# Patient Record
Sex: FEMALE | Race: OTHER
[De-identification: names, ages, dates, MRNs, and addresses within clinical notes are randomized per-mention and may not be internally consistent; named-entity substitution may affect disease eponyms.]

---

## 2019-03-15 ENCOUNTER — HOSPITAL ENCOUNTER (EMERGENCY)
Dept: HOSPITAL 72 - EMR | Age: 29
Discharge: HOME | End: 2019-03-15
Payer: SELF-PAY

## 2019-03-15 VITALS — HEIGHT: 71 IN | BODY MASS INDEX: 39.2 KG/M2 | WEIGHT: 280 LBS

## 2019-03-15 VITALS — DIASTOLIC BLOOD PRESSURE: 82 MMHG | SYSTOLIC BLOOD PRESSURE: 129 MMHG

## 2019-03-15 DIAGNOSIS — K76.0: ICD-10-CM

## 2019-03-15 DIAGNOSIS — R10.9: ICD-10-CM

## 2019-03-15 DIAGNOSIS — R19.7: Primary | ICD-10-CM

## 2019-03-15 LAB
ADD MANUAL DIFF: NO
ALBUMIN SERPL-MCNC: 3.4 G/DL (ref 3.4–5)
ALBUMIN/GLOB SERPL: 0.8 {RATIO} (ref 1–2.7)
ALP SERPL-CCNC: 101 U/L (ref 46–116)
ALT SERPL-CCNC: 65 U/L (ref 12–78)
ANION GAP SERPL CALC-SCNC: 6 MMOL/L (ref 5–15)
APPEARANCE UR: (no result)
APTT PPP: (no result) S
AST SERPL-CCNC: 27 U/L (ref 15–37)
BASOPHILS NFR BLD AUTO: 1.3 % (ref 0–2)
BILIRUB SERPL-MCNC: 0.3 MG/DL (ref 0.2–1)
BUN SERPL-MCNC: 10 MG/DL (ref 7–18)
CALCIUM SERPL-MCNC: 9.4 MG/DL (ref 8.5–10.1)
CHLORIDE SERPL-SCNC: 102 MMOL/L (ref 98–107)
CO2 SERPL-SCNC: 29 MMOL/L (ref 21–32)
CREAT SERPL-MCNC: 0.9 MG/DL (ref 0.55–1.3)
EOSINOPHIL NFR BLD AUTO: 1.5 % (ref 0–3)
ERYTHROCYTE [DISTWIDTH] IN BLOOD BY AUTOMATED COUNT: 12 % (ref 11.6–14.8)
GLOBULIN SER-MCNC: 4.2 G/DL
GLUCOSE UR STRIP-MCNC: NEGATIVE MG/DL
HCT VFR BLD CALC: 42.2 % (ref 37–47)
HGB BLD-MCNC: 13.5 G/DL (ref 12–16)
KETONES UR QL STRIP: (no result)
LEUKOCYTE ESTERASE UR QL STRIP: (no result)
LYMPHOCYTES NFR BLD AUTO: 21 % (ref 20–45)
MCV RBC AUTO: 87 FL (ref 80–99)
MONOCYTES NFR BLD AUTO: 7.8 % (ref 1–10)
NEUTROPHILS NFR BLD AUTO: 68.5 % (ref 45–75)
NITRITE UR QL STRIP: NEGATIVE
PH UR STRIP: 5 [PH] (ref 4.5–8)
PLATELET # BLD: 354 K/UL (ref 150–450)
POTASSIUM SERPL-SCNC: 3.8 MMOL/L (ref 3.5–5.1)
PROT UR QL STRIP: (no result)
RBC # BLD AUTO: 4.85 M/UL (ref 4.2–5.4)
SODIUM SERPL-SCNC: 136 MMOL/L (ref 136–145)
SP GR UR STRIP: 1.02 (ref 1–1.03)
UROBILINOGEN UR-MCNC: NORMAL MG/DL (ref 0–1)
WBC # BLD AUTO: 10.1 K/UL (ref 4.8–10.8)

## 2019-03-15 PROCEDURE — 76700 US EXAM ABDOM COMPLETE: CPT

## 2019-03-15 PROCEDURE — 36415 COLL VENOUS BLD VENIPUNCTURE: CPT

## 2019-03-15 PROCEDURE — 80053 COMPREHEN METABOLIC PANEL: CPT

## 2019-03-15 PROCEDURE — 85025 COMPLETE CBC W/AUTO DIFF WBC: CPT

## 2019-03-15 PROCEDURE — 87086 URINE CULTURE/COLONY COUNT: CPT

## 2019-03-15 PROCEDURE — 96374 THER/PROPH/DIAG INJ IV PUSH: CPT

## 2019-03-15 PROCEDURE — 96361 HYDRATE IV INFUSION ADD-ON: CPT

## 2019-03-15 PROCEDURE — 83690 ASSAY OF LIPASE: CPT

## 2019-03-15 PROCEDURE — 96375 TX/PRO/DX INJ NEW DRUG ADDON: CPT

## 2019-03-15 PROCEDURE — 81003 URINALYSIS AUTO W/O SCOPE: CPT

## 2019-03-15 PROCEDURE — 99284 EMERGENCY DEPT VISIT MOD MDM: CPT

## 2019-03-15 NOTE — NUR
ED Nurse Note:



Pt from [facility] came in due to abd. pain with N/V and liquid diarrhea x 3 days. Pt 
alos c/o decreased in appetite. Pt is AAO x4, ambulatory with unlabored 
breathing.

## 2019-03-15 NOTE — EMERGENCY ROOM REPORT
History of Present Illness


General


Chief Complaint:  Abdominal Pain


Source:  Patient





Present Illness


HPI


28-year-old female patient presents the ER complaining of abdominal pain, nausea

, diarrhea for the past 3 days.  Reports subjective fever at home, afebrile 

currently in the ER.  Denies vomiting.  Denies blood in diarrhea.  Reports 

nausea.  Denies recent travel outside the country.  Reports abdominal pain is 

mostly in the right upper quadrant pain during this time.  Denies other 

aggravating or relieving factors.  Denies dysuria, hematuria, vaginal 

discharge.  Denies chest pain or shortness of breath.


Allergies:  


Coded Allergies:  


     No Known Allergies (Unverified , 10/2/17)





Patient History


Past Medical History:  see triage record


Last Menstrual Period:  on period


Reviewed Nursing Documentation:  PMH: Agreed; PSxH: Agreed





Nursing Documentation-PMH


Past Medical History:  No History, Except For





Review of Systems


All Other Systems:  negative except mentioned in HPI





Physical Exam





Vital Signs








  Date Time  Temp Pulse Resp B/P (MAP) Pulse Ox O2 Delivery O2 Flow Rate FiO2


 


3/15/19 16:49 98.8 108 16 134/87 96 Room Air  








Sp02 EP Interpretation:  reviewed, normal


General Appearance:  well appearing, no apparent distress, alert, GCS 15, non-

toxic


Head:  normocephalic, atraumatic


Eyes:  bilateral eye normal inspection, bilateral eye PERRL


ENT:  hearing grossly normal, normal pharynx, no angioedema, normal voice, 

uvula midline, moist mucus membranes


Neck:  full range of motion, no meningismus, no bony tend


Respiratory:  lungs clear, normal breath sounds, no rhonchi, no respiratory 

distress, no accessory muscle use, no wheezing, speaking full sentences


Cardiovascular #1:  regular rate, rhythm, no edema


Gastrointestinal:  normal bowel sounds, soft, no mass, non-distended, no 

guarding, no rebound, tenderness - Right upper quadrant, other - Negative Morgan

, negative obturator, negative Rovsing, negative heel strike


Genitourinary:  no CVA tenderness


Musculoskeletal:  back normal, digits/nails normal, gait/station normal, normal 

range of motion, non-tender


Neurologic:  alert, oriented x3, responsive, motor strength/tone normal, 

sensory intact


Skin:  no rash





Medical Decision Making


PA Attestation


Dr. Hu  is my supervising Physician whom patient management has been 

discussed with.


Diagnostic Impression:  


 Primary Impression:  


 Diarrhea


 Additional Impressions:  


 Abdominal pain


 Hepatic steatosis


ER Course


Pt. presents to the ED c/o  abdominal pain and diarrhea.





Ddx considered but are not limited to viral syndrome, gastritis, enteritis, 

food poisoning, GERD, reflux, cholecystitis, cholelithiasis, pancreatitis, 

dehydration.


Physical exam benign, negative Rovsing, negative obturator, low suspicion for 

appendicitis.





Vital signs: are WNL, pt. is afebrile at discharge.





ED COURSE:


Provide with Zofran pain medication and GI cocktail.





No fever, no blood in stool, no recent travel or hospitalizations, does not 

require abx treatment at this time.


No signs of dehydration, moist mucus membranes, cap refill <2seconds, normal 

skin turgor.  Provide with IV fluids.





CBC and CMP unremarkable, no elevation WBCs in LFTs, lipase within normal 

limits.


UA shows moderate epithelial cells, likely contaminated sample, patient denies 

dysuria or hematuria, low suspicion for UTI, does not require antibiotics at 

this time.  Elevated RBCs likely due to patient currently on menstrual period.





Abdominal ultrasound shows hepatic steatosis and hepatomegaly, otherwise 

negative.


Discuss results with the patient.  


Provided patient with copy of results. 


Instructed patient to followup with PCP and discuss results of report with 

patient, discuss need for further treatment and referral.


Follow-up with GI specialist.





Patient instructed on clear liquid and BRAT diet. Patient instructed to remain 

hydrated, drink plenty of fluids.


Patient questions asked and answered.


Patient states understanding and agreement to treatment plan.


ER precautions given, return to ER for new or worsening of symptoms.





DISCHARGE: 





At this time pt. is stable for d/c to home.  Patient is resting comfortably, 

laughing, in no acute distress, nontoxic appearing.


Will provide printed patient care instructions, and any necessary 

prescriptions. 


Care plan and follow up instructions have been discussed with the patient prior 

to discharge.


Patient instructed to followup with PCP in 3-5 days.


Patient reports understanding and agreement to treatment plan.


Patient questions asked and answered.


ER precautions given; patient instructed to return to ER for new or worsening 

of symptoms including but not limited to fever, intractable vomiting, severe 

abdominal pain, blood in stool.





- Please note that this Emergency Department Report was dictated using [employer] technology software, occasionally this can lead to 

erroneous entry secondary to interpretation by the dictation equipment.





Labs








Test


  3/15/19


17:28 3/15/19


17:30


 


White Blood Count


  10.1 K/UL


(4.8-10.8) 


 


 


Red Blood Count


  4.85 M/UL


(4.20-5.40) 


 


 


Hemoglobin


  13.5 G/DL


(12.0-16.0) 


 


 


Hematocrit


  42.2 %


(37.0-47.0) 


 


 


Mean Corpuscular Volume 87 FL (80-99)  


 


Mean Corpuscular Hemoglobin


  27.9 PG


(27.0-31.0) 


 


 


Mean Corpuscular Hemoglobin


Concent 32.1 G/DL


(32.0-36.0) 


 


 


Red Cell Distribution Width


  12.0 %


(11.6-14.8) 


 


 


Platelet Count


  354 K/UL


(150-450) 


 


 


Mean Platelet Volume


  6.8 FL


(6.5-10.1) 


 


 


Neutrophils (%) (Auto)


  68.5 %


(45.0-75.0) 


 


 


Lymphocytes (%) (Auto)


  21.0 %


(20.0-45.0) 


 


 


Monocytes (%) (Auto)


  7.8 %


(1.0-10.0) 


 


 


Eosinophils (%) (Auto)


  1.5 %


(0.0-3.0) 


 


 


Basophils (%) (Auto)


  1.3 %


(0.0-2.0) 


 


 


Sodium Level


  136 MMOL/L


(136-145) 


 


 


Potassium Level


  3.8 MMOL/L


(3.5-5.1) 


 


 


Chloride Level


  102 MMOL/L


() 


 


 


Carbon Dioxide Level


  29 MMOL/L


(21-32) 


 


 


Anion Gap


  6 mmol/L


(5-15) 


 


 


Blood Urea Nitrogen


  10 mg/dL


(7-18) 


 


 


Creatinine


  0.9 MG/DL


(0.55-1.30) 


 


 


Estimat Glomerular Filtration


Rate > 60 mL/min


(>60) 


 


 


Glucose Level


  122 MG/DL


() 


 


 


Calcium Level


  9.4 MG/DL


(8.5-10.1) 


 


 


Total Bilirubin


  0.3 MG/DL


(0.2-1.0) 


 


 


Aspartate Amino Transf


(AST/SGOT) 27 U/L (15-37) 


  


 


 


Alanine Aminotransferase


(ALT/SGPT) 65 U/L (12-78) 


  


 


 


Alkaline Phosphatase


  101 U/L


() 


 


 


Total Protein


  7.6 G/DL


(6.4-8.2) 


 


 


Albumin


  3.4 G/DL


(3.4-5.0) 


 


 


Globulin 4.2 g/dL  


 


Albumin/Globulin Ratio 0.8 (1.0-2.7)  


 


Lipase


  85 U/L


() 


 


 


Urine Color  Red 


 


Urine Appearance


  


  Slightly


cloudy


 


Urine pH  5 (4.5-8.0) 


 


Urine Specific Gravity


  


  1.020


(1.005-1.035)


 


Urine Protein  4+ (NEGATIVE) 


 


Urine Glucose (UA)


  


  Negative


(NEGATIVE)


 


Urine Ketones  1+ (NEGATIVE) 


 


Urine Blood  5+ (NEGATIVE) 


 


Urine Nitrite


  


  Negative


(NEGATIVE)


 


Urine Bilirubin


  


  Negative


(NEGATIVE)


 


Urine Urobilinogen


  


  Normal MG/DL


(0.0-1.0)


 


Urine Leukocyte Esterase  1+ (NEGATIVE) 


 


Urine RBC


  


  Tntc /HPF (0 -


2)


 


Urine WBC


  


  2-4 /HPF (0 -


2)


 


Urine Squamous Epithelial


Cells 


  Many /LPF


(NONE/OCC)


 


Urine Bacteria


  


  Moderate /HPF


(NONE)








CT/MRI/US Diagnostic Results


CT/MRI/US Diagnostic Results :  


   Imaging Test Ordered:  Abdominal ultrasound


   Impression


Hepatomegaly with hepatic steatosis.





Otherwise unremarkable abdominal ultrasound.





Last Vital Signs








  Date Time  Temp Pulse Resp B/P (MAP) Pulse Ox O2 Delivery O2 Flow Rate FiO2


 


3/15/19 16:59  108 16   Room Air  


 


3/15/19 16:49 98.8   134/87 96   








Status:  improved


Disposition:  HOME, SELF-CARE


Condition:  Stable


Scripts


Acetaminophen* (TYLENOL EXTRA STRENGTH*) 500 Mg Tablet


500 MG ORAL Q8H PRN for Prn Headache/Temp > 101, #30 TAB 0 Refills


   Prov: Mu Witt         3/15/19


Patient Instructions:  Abdominal Pain, Adult, Diarrhea, Adult, Easy-to-Read, 

Food Choices to Help Relieve Diarrhea, Adult, Nonalcoholic Fatty Liver Disease 

Diet





Additional Instructions:  


Followup with primary care provider in 3 -5 days.  Discussed referral to GI 

specialist to discuss ultrasound results. 


Avoid spicy foods, avoid dairy foods.


BRAT diet: bananas, rice, apple sauce, toast.


Clear liquid diet.


Consider Immodium for diarrhea and Tylenol for pain symptoms.


Take medications as directed. 


Patient questions asked and answered.


ER precautions given, patient instructed to return to ER immediately for any 

new or worsening of symptoms.











Mu Witt Mar 15, 2019 17:22

## 2019-04-19 ENCOUNTER — HOSPITAL ENCOUNTER (EMERGENCY)
Dept: HOSPITAL 72 - EMR | Age: 29
Discharge: HOME | End: 2019-04-19
Payer: SELF-PAY

## 2019-04-19 VITALS — WEIGHT: 282 LBS | BODY MASS INDEX: 40.37 KG/M2 | HEIGHT: 70 IN

## 2019-04-19 VITALS — DIASTOLIC BLOOD PRESSURE: 67 MMHG | SYSTOLIC BLOOD PRESSURE: 119 MMHG

## 2019-04-19 VITALS — SYSTOLIC BLOOD PRESSURE: 125 MMHG | DIASTOLIC BLOOD PRESSURE: 77 MMHG

## 2019-04-19 DIAGNOSIS — D25.9: Primary | ICD-10-CM

## 2019-04-19 DIAGNOSIS — N92.1: ICD-10-CM

## 2019-04-19 LAB
ADD MANUAL DIFF: NO
ALBUMIN SERPL-MCNC: 3.5 G/DL (ref 3.4–5)
ALBUMIN/GLOB SERPL: 0.9 {RATIO} (ref 1–2.7)
ALP SERPL-CCNC: 114 U/L (ref 46–116)
ALT SERPL-CCNC: 82 U/L (ref 12–78)
ANION GAP SERPL CALC-SCNC: 9 MMOL/L (ref 5–15)
AST SERPL-CCNC: 31 U/L (ref 15–37)
BASOPHILS NFR BLD AUTO: 0.7 % (ref 0–2)
BILIRUB SERPL-MCNC: 0.3 MG/DL (ref 0.2–1)
BUN SERPL-MCNC: 9 MG/DL (ref 7–18)
CALCIUM SERPL-MCNC: 9 MG/DL (ref 8.5–10.1)
CHLORIDE SERPL-SCNC: 100 MMOL/L (ref 98–107)
CO2 SERPL-SCNC: 29 MMOL/L (ref 21–32)
CREAT SERPL-MCNC: 0.8 MG/DL (ref 0.55–1.3)
EOSINOPHIL NFR BLD AUTO: 1.6 % (ref 0–3)
ERYTHROCYTE [DISTWIDTH] IN BLOOD BY AUTOMATED COUNT: 12.7 % (ref 11.6–14.8)
GLOBULIN SER-MCNC: 4 G/DL
HCT VFR BLD CALC: 43.4 % (ref 37–47)
HGB BLD-MCNC: 14.2 G/DL (ref 12–16)
LYMPHOCYTES NFR BLD AUTO: 24.1 % (ref 20–45)
MCV RBC AUTO: 86 FL (ref 80–99)
MONOCYTES NFR BLD AUTO: 4.6 % (ref 1–10)
NEUTROPHILS NFR BLD AUTO: 69 % (ref 45–75)
PLATELET # BLD: 403 K/UL (ref 150–450)
POTASSIUM SERPL-SCNC: 3.9 MMOL/L (ref 3.5–5.1)
RBC # BLD AUTO: 5.06 M/UL (ref 4.2–5.4)
SODIUM SERPL-SCNC: 138 MMOL/L (ref 136–145)
WBC # BLD AUTO: 11.4 K/UL (ref 4.8–10.8)

## 2019-04-19 PROCEDURE — 84702 CHORIONIC GONADOTROPIN TEST: CPT

## 2019-04-19 PROCEDURE — 76830 TRANSVAGINAL US NON-OB: CPT

## 2019-04-19 PROCEDURE — 99284 EMERGENCY DEPT VISIT MOD MDM: CPT

## 2019-04-19 PROCEDURE — 80053 COMPREHEN METABOLIC PANEL: CPT

## 2019-04-19 PROCEDURE — 85025 COMPLETE CBC W/AUTO DIFF WBC: CPT

## 2019-04-19 PROCEDURE — 76856 US EXAM PELVIC COMPLETE: CPT

## 2019-04-19 PROCEDURE — 36415 COLL VENOUS BLD VENIPUNCTURE: CPT

## 2019-04-19 NOTE — DIAGNOSTIC IMAGING REPORT
Indication: Pelvic pain: Reported negative pregnancy test

 

Technique: Transabdominal and endovaginal pelvic ultrasound was performed.

 

Findings: The uterus measures 6.4 x 4.7 x 2.8 cm/41 mL. Endometrium is normal in

thickness at 4.1 mm. Within the posterior myometrium there is suggestion of a

heterogeneous mass which measures approximately 1 cm. This may represent a fibroid.

The right ovary measures 4.5 x 2.1 x 3.5 cm/9.5 mL. The left ovary measures 4 x 1.9 x

1.9 cm/7.3 mL. Color and Doppler flow documented in the bilateral ovaries. Trace

simple appearing fluid is noted in the cul-de-sac.

 

IMPRESSION: Question small mass lesion in the posterior myometrium which may

represent a fibroid. This measures up to 1 cm in greatest dimension.

*  Color Doppler flow demonstrated in the bilateral ovaries.

*  Trace simple appearing free pelvic fluid likely physiologic.

## 2019-04-19 NOTE — EMERGENCY ROOM REPORT
History of Present Illness


General


Chief Complaint:  Abdominal Pain


Source:  Patient





Present Illness


HPI


28-year-old female presents with intermittent vaginal bleeding since 2019.  States that her last normal period was 2019.  States that 

she been having intermittent spotting with intermittent blood clots coming out.

  States that is not significant to have to change a full pad daily but does 

have to use a liner daily because of her vaginal bleeding.  She denies taking 

any oral contraceptives at this time.  She has no history of dysmenorrhea in 

the past.  She denies any fatigue, shortness of breath, chest pain, vaginal 

discharge, flank pain, or dyspareunia.


Allergies:  


Coded Allergies:  


     No Known Allergies (Unverified , 10/2/17)





Patient History


Past Medical History:  see triage record


Past Surgical History:  none


Pertinent Family History:  none


Last Menstrual Period:  on period


:  0


Reviewed Nursing Documentation:  PMH: Agreed; PSxH: Agreed





Nursing Documentation-PMH


Past Medical History:  No History, Except For





Review of Systems


All Other Systems:  negative except mentioned in HPI





Physical Exam





Vital Signs








  Date Time  Temp Pulse Resp B/P (MAP) Pulse Ox O2 Delivery O2 Flow Rate FiO2


 


19 12:51 99.1 107 16 125/77 99 Room Air  








Sp02 EP Interpretation:  reviewed, normal


General Appearance:  no apparent distress, alert, GCS 15, non-toxic


Head:  normocephalic, atraumatic


Eyes:  bilateral eye normal inspection, bilateral eye PERRL


ENT:  hearing grossly normal, normal pharynx, no angioedema, normal voice


Respiratory:  chest non-tender, lungs clear, normal breath sounds, speaking 

full sentences


Cardiovascular #1:  regular rate, rhythm, no edema


Gastrointestinal:  normal bowel sounds, non tender, soft, non-distended, no 

guarding, no rebound


Genitourinary:  no CVA tenderness


Musculoskeletal:  gait/station normal


Neurologic:  alert, oriented x3, responsive, motor strength/tone normal, 

sensory intact, speech normal


Psychiatric:  judgement/insight normal, memory normal, mood/affect normal, no 

suicidal/homicidal ideation


Skin:  normal color, no rash, warm/dry, well hydrated





Medical Decision Making


PA Attestation


Dr. Gallardo is my supervising physician with whom patient management has been 

discussed with.


Diagnostic Impression:  


 Primary Impression:  


 Uterine fibroid


 Qualified Codes:  D25.9 - Leiomyoma of uterus, unspecified


 Additional Impression:  


 Metrorrhagia


ER Course


28-year-old female with intermittent vaginal bleeding.  Patient CBC shows no 

signs of any angina or sepsis, she is no significant lateral abnormalities.  

hCG is negative.  Ultrasound of the abdomen shows no ovarian torsion but does 

show a 1 cm fibroid likely in the myometrium.  Patient has no abdominal pain.   

At this time the patient appears stable for discharge home without any emergent 

exam findings.  Will provide printed patient care instructions, and any 

necessary prescriptions. Care plan and follow up instructions have been 

discussed with the patient prior to discharge.


CT/MRI/US Diagnostic Results


CT/MRI/US Diagnostic Results :  


   Imaging Test Ordered:  Transvag US


   Impression


Questionable small mass lesion in the posterior myometrium which may represent 

a fibroid.  This measures up to 1 cm in the greatest dimension.  Color Doppler 

flow demonstrated in bilateral ovaries.  Trace simple appearing free pelvic 

fluid likely physiologic.





Last Vital Signs








  Date Time  Temp Pulse Resp B/P (MAP) Pulse Ox O2 Delivery O2 Flow Rate FiO2


 


19 13:20 99.1 102 16 125/77 99 Room Air  








Disposition:  HOME, SELF-CARE


Condition:  Stable


Patient Instructions:  Uterine Fibroids





Additional Instructions:  


Patient is to follow-up with her OB/GYN within the next 2 to 3 days.  Return to 

the ER should he have any fatigue, worsening bleeding, or any abdominal pain.











Melissa Gaspar 2019 14:53

## 2019-04-19 NOTE — NUR
ED Nurse Note:



pt walked in c/o vaginal bleeding and lower abd pain, pt states her period has 
been lasting 1 month, denies dizziness, n/v/d, problem with urination. pt 
AA&ox4, resp even and unlabored on RA, abd soft nontender, -n/v/d, ambulates w/ 
steady gait, will cont monitor.

## 2019-04-19 NOTE — NUR
ED Nurse Note:





pt cleared to be d/c per ERMD, pt discharge and aftercare instruction provided, 
pt education done via discussion and handout, pt advised to follow up with pcp 
or return to ed if changes in condition, pt verbalized understanding and agrees 
with plan, vss, ambulatory w/ steady gait, left w/ all belongings, ID band 
removed.

## 2020-07-10 ENCOUNTER — HOSPITAL ENCOUNTER (EMERGENCY)
Dept: HOSPITAL 72 - EMR | Age: 30
Discharge: HOME | End: 2020-07-10
Payer: MEDICAID

## 2020-07-10 VITALS — BODY MASS INDEX: 37.22 KG/M2 | HEIGHT: 70 IN | WEIGHT: 260 LBS

## 2020-07-10 VITALS — DIASTOLIC BLOOD PRESSURE: 82 MMHG | SYSTOLIC BLOOD PRESSURE: 173 MMHG

## 2020-07-10 VITALS — SYSTOLIC BLOOD PRESSURE: 173 MMHG | DIASTOLIC BLOOD PRESSURE: 82 MMHG

## 2020-07-10 DIAGNOSIS — E11.9: ICD-10-CM

## 2020-07-10 DIAGNOSIS — M54.5: Primary | ICD-10-CM

## 2020-07-10 DIAGNOSIS — E66.9: ICD-10-CM

## 2020-07-10 LAB
APPEARANCE UR: CLEAR
APTT PPP: YELLOW S
GLUCOSE UR STRIP-MCNC: NEGATIVE MG/DL
KETONES UR QL STRIP: (no result)
LEUKOCYTE ESTERASE UR QL STRIP: (no result)
NITRITE UR QL STRIP: NEGATIVE
PH UR STRIP: 5 [PH] (ref 4.5–8)
PROT UR QL STRIP: (no result)
SP GR UR STRIP: 1.02 (ref 1–1.03)
UROBILINOGEN UR-MCNC: 1 MG/DL (ref 0–1)

## 2020-07-10 PROCEDURE — 81025 URINE PREGNANCY TEST: CPT

## 2020-07-10 PROCEDURE — 96372 THER/PROPH/DIAG INJ SC/IM: CPT

## 2020-07-10 PROCEDURE — 99283 EMERGENCY DEPT VISIT LOW MDM: CPT

## 2020-07-10 PROCEDURE — 81003 URINALYSIS AUTO W/O SCOPE: CPT

## 2020-07-10 NOTE — EMERGENCY ROOM REPORT
History of Present Illness


General


Chief Complaint:  Lower Back Pain or Injury


Source:  Patient





Present Illness


HPI


This a 30-year-old female with history of diabetes.  She presents with chief 

complaint of back pain.  Onset this evening.  She said she sat down and felt a 

sharp pain in her left mid back area.  Pain going down toward her hip and 

buttock area.  Worse with movement.  Worse with inspiration.  Better with 

sitting still.  Pain is 9 out of 10.  She tried leftover meloxicam and 

gabapentin and did not help.  No trauma.  No fever chills.  No incontinence of 

bowel or urine.  No anesthesia.


Allergies:  


Coded Allergies:  


     No Known Allergies (Unverified , 10/2/17)





COVID-19 Screening


Contact w/high risk pt:  No


Experienced COVID-19 symptoms?:  No


COVID-19 Testing performed PTA:  No





Patient History


Past Medical History:  see triage record, old chart reviewed, DM


Past Surgical History:  other


Pertinent Family History:  none


Social History:  Denies: smoking


Last Menstrual Period:  05/2020


Pregnant Now:  No


Immunizations:  other


Reviewed Nursing Documentation:  PMH: Agreed; PSxH: Agreed





Review of Systems


Eye:  Denies: eye pain, blurred vision


ENT:  Denies: ear pain, nose congestion, throat swelling


Respiratory:  Denies: cough, shortness of breath


Cardiovascular:  Denies: chest pain, palpitations


Gastrointestinal:  Denies: abdominal pain, diarrhea, nausea, vomiting


Musculoskeletal:  Reports: back pain; Denies: joint pain


Skin:  Denies: rash


Neurological:  Denies: headache, numbness


Endocrine:  Denies: increased thirst, increased urine


Hematologic/Lymphatic:  Denies: easy bruising


All Other Systems:  negative except mentioned in HPI





Physical Exam





Vital Signs








  Date Time  Temp Pulse Resp B/P (MAP) Pulse Ox O2 Delivery O2 Flow Rate FiO2


 


7/10/20 00:10 99.0 90 18 173/82 (112) 97 Room Air  





Vitals with high blood pressure


Sp02 EP Interpretation:  reviewed, normal


General Appearance:  well appearing, no apparent distress, alert, obese


Head:  normocephalic, atraumatic


Eyes:  bilateral eye PERRL, bilateral eye EOMI


ENT:  hearing grossly normal, normal pharynx


Neck:  full range of motion, supple, no meningismus


Respiratory:  chest non-tender, lungs clear, normal breath sounds


Cardiovascular #1:  regular rate, rhythm, no murmur


Gastrointestinal:  normal bowel sounds, non tender, no mass, no organomegaly, 

no bruit, non-distended


Musculoskeletal:  normal range of motion, gait/station normal, other - 

Tenderness to the left flank paraspinous muscle


Psychiatric:  mood/affect normal





Medical Decision Making


Diagnostic Impression:  


 Primary Impression:  


 Low back pain


 Qualified Codes:  M54.5 - Low back pain


ER Course


Presents with back pain.  No trauma.  No evidence of cauda equina syndrome, 

spinal epidural abscess or neoplastic process.  No evidence of UTI or Marlon.  

Will discharge home.





Last Vital Signs








  Date Time  Temp Pulse Resp B/P (MAP) Pulse Ox O2 Delivery O2 Flow Rate FiO2


 


7/10/20 00:17 99.0 91 18 173/82 97 Room Air  








Status:  improved


Disposition:  HOME, SELF-CARE


Condition:  Stable


Scripts


Ibuprofen* (MOTRIN*) 600 Mg Tablet


600 MG ORAL Q6H PRN for For Pain, #30 TAB 0 Refills


   Prov: John Hein MD         7/10/20 


Hydrocodone/Acetaminophen 5-325* (HYDROCODONE/ACETAMINOPHEN 5-325*) 1 Each 

Tablet


1 TAB ORAL Q6H PRN for For Pain, #20 TAB 0 Refills


   Prov: John Hein MD         7/10/20


Referrals:  


NON PHYSICIAN (PCP)


Patient Instructions:  Back Pain, Adult





Additional Instructions:  


No heavy lifting.  Follow-up with your doctor in 7 days.  Return if symptoms 

worsen.











John Hein MD Jul 10, 2020 00:30

## 2020-07-10 NOTE — NUR
ED Nurse Note:

endorsed care from WHITNEY Corrigan. patient provided urine. medications given as 
ordered. assisted patient back to [facility], will continue to monitor

## 2020-07-10 NOTE — NUR
ER DISCHARGE NOTE:

Patient is cleared to be discharged per ERMD, pt is aox4, on room air, with 
stable vital signs. pt was given dc and prescription instructions, pt was able 
to verbalize understanding, pt id band removed and patient departed with all 
belongings to call an uber to get home.

## 2020-11-20 ENCOUNTER — HOSPITAL ENCOUNTER (EMERGENCY)
Dept: HOSPITAL 72 - EMR | Age: 30
Discharge: HOME | End: 2020-11-20
Payer: MEDICAID

## 2020-11-20 VITALS — SYSTOLIC BLOOD PRESSURE: 155 MMHG | DIASTOLIC BLOOD PRESSURE: 103 MMHG

## 2020-11-20 VITALS — SYSTOLIC BLOOD PRESSURE: 142 MMHG | DIASTOLIC BLOOD PRESSURE: 98 MMHG

## 2020-11-20 VITALS — HEIGHT: 71 IN | WEIGHT: 260 LBS | BODY MASS INDEX: 36.4 KG/M2

## 2020-11-20 DIAGNOSIS — R10.9: ICD-10-CM

## 2020-11-20 DIAGNOSIS — K02.9: Primary | ICD-10-CM

## 2020-11-20 DIAGNOSIS — E11.9: ICD-10-CM

## 2020-11-20 LAB
ADD MANUAL DIFF: NO
ALBUMIN SERPL-MCNC: 3.5 G/DL (ref 3.4–5)
ALBUMIN/GLOB SERPL: 0.9 {RATIO} (ref 1–2.7)
ALP SERPL-CCNC: 100 U/L (ref 46–116)
ALT SERPL-CCNC: 55 U/L (ref 12–78)
ANION GAP SERPL CALC-SCNC: 8 MMOL/L (ref 5–15)
APPEARANCE UR: CLEAR
APTT PPP: YELLOW S
AST SERPL-CCNC: 24 U/L (ref 15–37)
BASOPHILS NFR BLD AUTO: 1.2 % (ref 0–2)
BILIRUB SERPL-MCNC: 0.3 MG/DL (ref 0.2–1)
BUN SERPL-MCNC: 13 MG/DL (ref 7–18)
CALCIUM SERPL-MCNC: 8.5 MG/DL (ref 8.5–10.1)
CHLORIDE SERPL-SCNC: 104 MMOL/L (ref 98–107)
CO2 SERPL-SCNC: 28 MMOL/L (ref 21–32)
CREAT SERPL-MCNC: 0.9 MG/DL (ref 0.55–1.3)
EOSINOPHIL NFR BLD AUTO: 3 % (ref 0–3)
ERYTHROCYTE [DISTWIDTH] IN BLOOD BY AUTOMATED COUNT: 12.7 % (ref 11.6–14.8)
GLOBULIN SER-MCNC: 3.7 G/DL
GLUCOSE UR STRIP-MCNC: NEGATIVE MG/DL
HCT VFR BLD CALC: 40.7 % (ref 37–47)
HGB BLD-MCNC: 13.4 G/DL (ref 12–16)
KETONES UR QL STRIP: NEGATIVE
LEUKOCYTE ESTERASE UR QL STRIP: NEGATIVE
LYMPHOCYTES NFR BLD AUTO: 27.7 % (ref 20–45)
MCV RBC AUTO: 87 FL (ref 80–99)
MONOCYTES NFR BLD AUTO: 6.8 % (ref 1–10)
NEUTROPHILS NFR BLD AUTO: 61.4 % (ref 45–75)
NITRITE UR QL STRIP: NEGATIVE
PH UR STRIP: 5 [PH] (ref 4.5–8)
PLATELET # BLD: 373 K/UL (ref 150–450)
POTASSIUM SERPL-SCNC: 3.6 MMOL/L (ref 3.5–5.1)
PROT UR QL STRIP: NEGATIVE
RBC # BLD AUTO: 4.67 M/UL (ref 4.2–5.4)
SODIUM SERPL-SCNC: 140 MMOL/L (ref 136–145)
SP GR UR STRIP: 1.02 (ref 1–1.03)
UROBILINOGEN UR-MCNC: NORMAL MG/DL (ref 0–1)
WBC # BLD AUTO: 11.3 K/UL (ref 4.8–10.8)

## 2020-11-20 PROCEDURE — 99284 EMERGENCY DEPT VISIT MOD MDM: CPT

## 2020-11-20 PROCEDURE — 96374 THER/PROPH/DIAG INJ IV PUSH: CPT

## 2020-11-20 PROCEDURE — 80053 COMPREHEN METABOLIC PANEL: CPT

## 2020-11-20 PROCEDURE — 85025 COMPLETE CBC W/AUTO DIFF WBC: CPT

## 2020-11-20 PROCEDURE — 83690 ASSAY OF LIPASE: CPT

## 2020-11-20 PROCEDURE — 64400 NJX AA&/STRD TRIGEMINAL NRV: CPT

## 2020-11-20 PROCEDURE — 96361 HYDRATE IV INFUSION ADD-ON: CPT

## 2020-11-20 PROCEDURE — 36415 COLL VENOUS BLD VENIPUNCTURE: CPT

## 2020-11-20 PROCEDURE — 81003 URINALYSIS AUTO W/O SCOPE: CPT

## 2020-11-20 PROCEDURE — 81025 URINE PREGNANCY TEST: CPT

## 2020-11-20 NOTE — EMERGENCY ROOM REPORT
History of Present Illness


General


Chief Complaint:  Pain


Source:  Patient





Present Illness


HPI


30-year-old female here with right upper tooth pain and left flank pain.  

Patient says that for the past several weeks she has been suffering from pain 

from a dental carry of the right upper first premolar.  She went to a dentist 

and is scheduled for a root canal that will not take place for another 2 weeks 

due to a change in the patient's insurance.  Has been taking ibuprofen and says 

"now I have kidney pain because of the ibuprofen."  Says that over the past 5 

days she has been experiencing intermittent left flank pain.  No fevers, chills,

vision changes, focal numbness or weakness, cranial nerve deficits, chest pain, 

palpitation, shortness of breath, abdominal pain, nausea, vomiting, diarrhea, 

dysuria, hematuria.


Allergies:  


Coded Allergies:  


     No Known Allergies (Unverified , 10/2/17)





COVID-19 Screening


Contact w/high risk pt:  No


Experienced COVID-19 symptoms?:  No


COVID-19 Testing performed PTA:  No





Patient History


Pregnant Now:  No





Nursing Documentation-[facility]


Past Medical History:  No History, Except For


Hx Diabetes:  Yes





Review of Systems


All Other Systems:  negative except mentioned in HPI





Physical Exam





Vital Signs








  Date Time  Temp Pulse Resp B/P (MAP) Pulse Ox O2 Delivery O2 Flow Rate FiO2


 


11/20/20 18:48 97.2 86 17 155/103 (120) 99 Room Air  








Sp02 EP Interpretation:  reviewed, normal


General Appearance:  no apparent distress, alert, non-toxic


Head:  normocephalic, atraumatic


Eyes:  bilateral eye normal inspection, bilateral eye PERRL


ENT:  hearing grossly normal, normal pharynx, no angioedema, normal voice, other

- Dental caries of the right upper first premolar


Neck:  full range of motion, supple/symm/no masses


Respiratory:  chest non-tender, lungs clear, normal breath sounds, speaking full

sentences


Cardiovascular #1:  regular rate, rhythm, no edema


Cardiovascular #2:  2+ carotid (R), 2+ carotid (L), 2+ radial (R), 2+ radial 

(L), 2+ dorsalis pedis (R), 2+ dorsalis pedis (L)


Gastrointestinal:  normal bowel sounds, non tender, soft, non-distended, no 

guarding, no rebound


Rectal:  deferred


Genitourinary:  normal inspection, other - Mild left CVA tenderness on 

percussion


Musculoskeletal:  back normal, normal range of motion, gait/station normal, non-

tender


Neurologic:  alert, motor strength/tone normal, oriented x3, sensory intact, 

responsive, speech normal


Psychiatric:  judgement/insight normal, memory normal, mood/affect normal, no 

suicidal/homicidal ideation


Lymphatic:  no adenopathy





Medical Decision Making


Diagnostic Impression:  


   Primary Impression:  


   Dental caries


   Additional Impression:  


   Flank pain


ER Course


Procedure: Nerve block: Apical nerve block of the right upper first premolar.  3

cc of 0.25% bupivacaine injected in the apical region of the right upper first 

premolar.  Patient tolerated procedure well





30-year-old female here with right upper tooth pain.  Patient had a dental carry

on physical examination and apical nerve block was performed using bupivacaine 

as described above.  Patient tolerated procedure well and had good resolution of

her pain.  CBC, CMP unremarkable.  She had normal kidney function.  She was 

given a liter of IV normal saline and toradol with good resolution of her 

symptoms.  Given a prescription for clindamycin and Norco.  She will follow-up 

with her dentist tomorrow.  Discharged in stable condition.





Last Vital Signs








  Date Time  Temp Pulse Resp B/P (MAP) Pulse Ox O2 Delivery O2 Flow Rate FiO2


 


11/20/20 18:48 97.2 86 17 155/103 (120) 99 Room Air  








Scripts


Hydrocodone Bit/Acetaminophen 5-325* (NORCO 5-325 TABLET*) 1 Each Tablet


1 TAB ORAL Q4H PRN for For Pain, #10 TAB


   Prov: Isra Berrios M.D.         11/20/20 


Clindamycin Hcl (CLINDAMYCIN HCL) 300 Mg Capsule


300 MG ORAL THREE TIMES A DAY, #21 CAP


   Prov: Isra Berrios M.D.         11/20/20


Referrals:  


[facility],REFERRING (PCP)











Isra Berrios M.D.                Nov 20, 2020 19:37

## 2020-11-20 NOTE — NUR
ED Nurse Note:

Pt ambulated to ED from home c/o R upper tooth pain radiating to her jaw since 
oct 27th, as well as new aching pain in bilateral planks. Pt is A&OX4, VSS. Pt 
knows she needs a root canal but can not see her dentist till 12/7. Pt denies 
trauma or injury or fever at home. Pt finished antibiotics already.